# Patient Record
Sex: MALE | Race: WHITE | NOT HISPANIC OR LATINO | Employment: FULL TIME | URBAN - METROPOLITAN AREA
[De-identification: names, ages, dates, MRNs, and addresses within clinical notes are randomized per-mention and may not be internally consistent; named-entity substitution may affect disease eponyms.]

---

## 2024-05-30 ENCOUNTER — HOSPITAL ENCOUNTER (EMERGENCY)
Facility: HOSPITAL | Age: 54
Discharge: LEFT BEFORE DISCHARGE | End: 2024-05-30
Attending: STUDENT IN AN ORGANIZED HEALTH CARE EDUCATION/TRAINING PROGRAM

## 2024-05-30 VITALS
TEMPERATURE: 99 F | DIASTOLIC BLOOD PRESSURE: 109 MMHG | RESPIRATION RATE: 18 BRPM | HEART RATE: 100 BPM | HEIGHT: 76 IN | OXYGEN SATURATION: 94 % | WEIGHT: 315 LBS | BODY MASS INDEX: 38.36 KG/M2 | SYSTOLIC BLOOD PRESSURE: 221 MMHG

## 2024-05-30 DIAGNOSIS — Z53.21 ELOPED FROM EMERGENCY DEPARTMENT: ICD-10-CM

## 2024-05-30 DIAGNOSIS — R73.9 HYPERGLYCEMIA: Primary | ICD-10-CM

## 2024-05-30 DIAGNOSIS — R07.9 CHEST PAIN: ICD-10-CM

## 2024-05-30 LAB — POCT GLUCOSE: 413 MG/DL (ref 70–110)

## 2024-05-30 PROCEDURE — 99284 EMERGENCY DEPT VISIT MOD MDM: CPT | Mod: 25

## 2024-05-30 PROCEDURE — 93005 ELECTROCARDIOGRAM TRACING: CPT

## 2024-05-30 PROCEDURE — 82962 GLUCOSE BLOOD TEST: CPT

## 2024-05-30 PROCEDURE — 93010 ELECTROCARDIOGRAM REPORT: CPT | Mod: ,,, | Performed by: INTERNAL MEDICINE

## 2024-05-31 NOTE — ED PROVIDER NOTES
"Encounter Date: 5/30/2024       History     Chief Complaint   Patient presents with    Hyperglycemia     Pt c/o left foot pain and swelling. Seen at  and sent to ED for evaluation of Glucose and HTN. Pt told CBG was "almost" 500 and that "he was close to going into a diabetic coma." /109 in triage. Pt does reports CP. No other new associated symptoms. Blurred vision "Always" per patient      HPI  Patient eloped after 1st provider evaluation in triage.  Review of patient's allergies indicates:  No Known Allergies  No past medical history on file.  No past surgical history on file.  No family history on file.     Review of Systems  Patient eloped after 1st provider evaluation in triage.  Physical Exam     Initial Vitals [05/30/24 1804]   BP Pulse Resp Temp SpO2   (!) 221/109 100 18 98.7 °F (37.1 °C) (!) 94 %      MAP       --         Physical Exam    ED Course   Procedures  Labs Reviewed   POCT GLUCOSE - Abnormal; Notable for the following components:       Result Value    POCT Glucose 413 (*)     All other components within normal limits   CBC W/ AUTO DIFFERENTIAL   COMPREHENSIVE METABOLIC PANEL   TROPONIN I   TROPONIN I   B-TYPE NATRIURETIC PEPTIDE   BETA - HYDROXYBUTYRATE, SERUM   URINALYSIS, REFLEX TO URINE CULTURE   Patient eloped after 1st provider evaluation in triage.       Imaging Results              X-Ray Chest AP Portable (Final result)  Result time 05/30/24 18:43:35      Final result by Galdino Velasquez MD (05/30/24 18:43:35)                   Impression:      1. Interstitial findings are accentuated by habitus, no large focal consolidation.      Electronically signed by: Galdino Velasquez MD  Date:    05/30/2024  Time:    18:43               Narrative:    EXAMINATION:  XR CHEST AP PORTABLE    CLINICAL HISTORY:  Chest Pain;    TECHNIQUE:  Single frontal view of the chest was performed.    COMPARISON:  None    FINDINGS:  The cardiomediastinal silhouette is not enlarged.  There is no pleural " effusion.  The trachea is midline.  The lungs are symmetrically expanded bilaterally with mildly coarse interstitial attenuation, accentuated by habitus..  No large focal consolidation seen.  There is no pneumothorax.  The osseous structures are remarkable for degenerative change..  Sclerotic focus within the proximal aspect of the right femur may reflect infarct.                                       Medications - No data to display  Medical Decision Making  Amount and/or Complexity of Data Reviewed  Labs: ordered.  Radiology: ordered.    Patient eloped after 1st provider evaluation in triage.                                  Clinical Impression:  Final diagnoses:  [R07.9] Chest pain  [R73.9] Hyperglycemia (Primary)  [Z53.21] Eloped from emergency department          ED Disposition Condition    Eloped Stable                Sekou Aviles PA-C  05/30/24 [id number]

## 2024-06-01 LAB
OHS QRS DURATION: 98 MS
OHS QTC CALCULATION: 468 MS